# Patient Record
Sex: MALE | Race: OTHER | HISPANIC OR LATINO
[De-identification: names, ages, dates, MRNs, and addresses within clinical notes are randomized per-mention and may not be internally consistent; named-entity substitution may affect disease eponyms.]

---

## 2023-02-27 ENCOUNTER — NON-APPOINTMENT (OUTPATIENT)
Age: 23
End: 2023-02-27

## 2023-02-28 ENCOUNTER — NON-APPOINTMENT (OUTPATIENT)
Age: 23
End: 2023-02-28

## 2023-02-28 ENCOUNTER — TRANSCRIPTION ENCOUNTER (OUTPATIENT)
Age: 23
End: 2023-02-28

## 2023-02-28 PROBLEM — Z00.00 ENCOUNTER FOR PREVENTIVE HEALTH EXAMINATION: Status: ACTIVE | Noted: 2023-02-28

## 2023-03-01 ENCOUNTER — NON-APPOINTMENT (OUTPATIENT)
Age: 23
End: 2023-03-01

## 2023-03-03 ENCOUNTER — NON-APPOINTMENT (OUTPATIENT)
Age: 23
End: 2023-03-03

## 2023-03-03 ENCOUNTER — APPOINTMENT (OUTPATIENT)
Dept: OTOLARYNGOLOGY | Facility: CLINIC | Age: 23
End: 2023-03-03
Payer: MEDICAID

## 2023-03-03 VITALS
DIASTOLIC BLOOD PRESSURE: 78 MMHG | SYSTOLIC BLOOD PRESSURE: 112 MMHG | HEIGHT: 67 IN | BODY MASS INDEX: 26.68 KG/M2 | HEART RATE: 78 BPM | WEIGHT: 170 LBS

## 2023-03-03 DIAGNOSIS — K12.30 ORAL MUCOSITIS (ULCERATIVE), UNSPECIFIED: ICD-10-CM

## 2023-03-03 DIAGNOSIS — K12.2 CELLULITIS AND ABSCESS OF MOUTH: ICD-10-CM

## 2023-03-03 DIAGNOSIS — K12.1 OTHER FORMS OF STOMATITIS: ICD-10-CM

## 2023-03-03 PROCEDURE — 99204 OFFICE O/P NEW MOD 45 MIN: CPT

## 2023-03-03 NOTE — ASSESSMENT
[FreeTextEntry1] : IMMUNE DEFICIENCY AGGRAVATING FACTOR\par DOXYCYCLIN\par MAGIC MOUTHWASH\par CONTACT PERCAUTION\par ANALGESIC PRN\par F/U ID\par F/U PMD

## 2023-03-03 NOTE — HISTORY OF PRESENT ILLNESS
[de-identified] : PAINFUL PALATE ULCE FOR THE PAST FEW DAYS\par UNDER CARE OF ID FOR IMMUNE DEFICIENCY\par MEDICAL HX REVIEWED

## 2023-03-03 NOTE — PHYSICAL EXAM
[Normal] : lingual tonsils are normal [Midline] : trachea located in midline position [de-identified] : SOFT PALATE AND UPPER UVULA ULCER ABOUT 8 MM WITH SURROUNDING REDNESS

## 2023-03-03 NOTE — REVIEW OF SYSTEMS
[Throat Pain] : throat pain [Throat Dryness] : throat dryness [Throat Itching] : throat itching [Negative] : Heme/Lymph [Patient Intake Form Reviewed] : Patient intake form was reviewed [FreeTextEntry7] : diffuclty swlalowujnf [de-identified] : headache

## 2023-03-16 ENCOUNTER — APPOINTMENT (OUTPATIENT)
Dept: INFECTIOUS DISEASE | Facility: CLINIC | Age: 23
End: 2023-03-16
Payer: MEDICAID

## 2023-03-16 ENCOUNTER — LABORATORY RESULT (OUTPATIENT)
Age: 23
End: 2023-03-16

## 2023-03-16 VITALS
SYSTOLIC BLOOD PRESSURE: 114 MMHG | HEIGHT: 67 IN | WEIGHT: 170 LBS | DIASTOLIC BLOOD PRESSURE: 75 MMHG | HEART RATE: 80 BPM | TEMPERATURE: 97.6 F | BODY MASS INDEX: 26.68 KG/M2 | OXYGEN SATURATION: 98 %

## 2023-03-16 DIAGNOSIS — Z23 ENCOUNTER FOR IMMUNIZATION: ICD-10-CM

## 2023-03-16 LAB
APPEARANCE: CLEAR
BASOPHILS # BLD AUTO: 0.05 K/UL
BASOPHILS NFR BLD AUTO: 0.9 %
BILIRUBIN URINE: NEGATIVE
BLOOD URINE: ABNORMAL
COLOR: YELLOW
EOSINOPHIL # BLD AUTO: 0.06 K/UL
EOSINOPHIL NFR BLD AUTO: 1.1 %
ESTIMATED AVERAGE GLUCOSE: 108 MG/DL
GLUCOSE QUALITATIVE U: NEGATIVE
HBA1C MFR BLD HPLC: 5.4 %
HCT VFR BLD CALC: 45.6 %
HGB BLD-MCNC: 14.5 G/DL
IMM GRANULOCYTES NFR BLD AUTO: 0.2 %
KETONES URINE: NEGATIVE
LEUKOCYTE ESTERASE URINE: NEGATIVE
LYMPHOCYTES # BLD AUTO: 1.25 K/UL
LYMPHOCYTES NFR BLD AUTO: 22.7 %
MAN DIFF?: NORMAL
MCHC RBC-ENTMCNC: 28.9 PG
MCHC RBC-ENTMCNC: 31.8 GM/DL
MCV RBC AUTO: 90.8 FL
MONOCYTES # BLD AUTO: 0.38 K/UL
MONOCYTES NFR BLD AUTO: 6.9 %
NEUTROPHILS # BLD AUTO: 3.76 K/UL
NEUTROPHILS NFR BLD AUTO: 68.2 %
NITRITE URINE: NEGATIVE
PH URINE: 6.5
PLATELET # BLD AUTO: 298 K/UL
PROTEIN URINE: NORMAL
RBC # BLD: 5.02 M/UL
RBC # FLD: 13.1 %
SPECIFIC GRAVITY URINE: 1.03
UROBILINOGEN URINE: NORMAL
WBC # FLD AUTO: 5.51 K/UL

## 2023-03-16 PROCEDURE — 99204 OFFICE O/P NEW MOD 45 MIN: CPT | Mod: 25

## 2023-03-16 PROCEDURE — 90686 IIV4 VACC NO PRSV 0.5 ML IM: CPT

## 2023-03-16 PROCEDURE — G0008: CPT

## 2023-03-16 RX ORDER — DOXYCYCLINE 100 MG/1
100 CAPSULE ORAL
Qty: 28 | Refills: 0 | Status: COMPLETED | COMMUNITY
Start: 2023-03-03 | End: 2023-03-16

## 2023-03-16 RX ORDER — DIPHENHYDRAMINE HYDROCHLORIDE AND LIDOCAINE HYDROCHLORIDE AND ALUMINUM HYDROXIDE AND MAGNESIUM HYDRO
KIT
Qty: 1 | Refills: 1 | Status: COMPLETED | COMMUNITY
Start: 2023-03-03 | End: 2023-03-16

## 2023-03-16 NOTE — REASON FOR VISIT
[Initial Evaluation] : an initial evaluation [FreeTextEntry1] : HIV initial consult/ transfer of care

## 2023-03-16 NOTE — REVIEW OF SYSTEMS
[Diarrhea] : diarrhea [Anxiety] : anxiety [FreeTextEntry7] : chronic intermittent  [de-identified] : occasional

## 2023-03-16 NOTE — ASSESSMENT
[FreeTextEntry1] : HIV initial consult/ transfer of care  [Treatment Education] : treatment education [Treatment Adherence] : treatment adherence [Rx Dose / Side Effects] : Rx dose/side effects [Drug Interactions / Side Effects] : drug interactions/side effects [HIV Education] : HIV Education [Sexuality / Safer Sex] : sexuality/safer sex

## 2023-03-16 NOTE — HISTORY OF PRESENT ILLNESS
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 \par denies any c/o at this time \par Immigrated from  6/2022\par \par \par PMH : Gastritis.  denies any recent travel, hospitalizations, blood transfusions. \par PCP : none \par Vaccines : COVID Astra Zenica x2, Pfizer x2, Moderna x1.  needs influenza vaccine.  Monkeypox vaccine x2. \par Screenings : none \par PSH : denies\par PFH : Father- DM, pancreatic CA.  MGM - HTN, osteoporosis.  Mat Aunt - HTN.  \par Social hx : denies any IVDU, confirms vaping.  Occasional ETOH,  denies any tattoos\par Sexual hx : MSM.  Last sexual encounter 2 weeks ago - usual partner.  confirms condom use.  denies any hx of STI. \par Partner : MSM.  Negative HIV- on PrEP\par Occupation : MA\par Lives with : aunt\par Who aware of HIV : friends, partner\par \par NKDA\par \par \par 3/16/2023 Plan\par HIV\par Counselled ARV medication currently taking does not exist in [place] in one pill form.  Counselled regarding Biktarvy - dosing, risks, benefits, SE, long term effects and monitoring.  \par 1. start Biktarvy one tab daily \par 2. do blood work \par 3. f/u one month\par 4. Influenza vaccine given today

## 2023-03-17 DIAGNOSIS — E55.9 VITAMIN D DEFICIENCY, UNSPECIFIED: ICD-10-CM

## 2023-03-17 LAB
25(OH)D3 SERPL-MCNC: 12.4 NG/ML
ALBUMIN SERPL ELPH-MCNC: 4.7 G/DL
ALP BLD-CCNC: 105 U/L
ALT SERPL-CCNC: 16 U/L
ANION GAP SERPL CALC-SCNC: 12 MMOL/L
AST SERPL-CCNC: 19 U/L
BILIRUB SERPL-MCNC: 0.3 MG/DL
BUN SERPL-MCNC: 9 MG/DL
C TRACH RRNA SPEC QL NAA+PROBE: NOT DETECTED
C TRACH RRNA SPEC QL NAA+PROBE: NOT DETECTED
CALCIUM SERPL-MCNC: 9.7 MG/DL
CD3 CELLS # BLD: 656 CELLS/UL
CD3 CELLS NFR BLD: 58 %
CD3+CD4+ CELLS # BLD: 321 CELLS/UL
CD3+CD4+ CELLS NFR BLD: 28 %
CD3+CD4+ CELLS/CD3+CD8+ CLL SPEC: 1.07 RATIO
CD3+CD8+ CELLS # SPEC: 301 CELLS/UL
CD3+CD8+ CELLS NFR BLD: 27 %
CHLORIDE SERPL-SCNC: 102 MMOL/L
CHOLEST SERPL-MCNC: 101 MG/DL
CO2 SERPL-SCNC: 26 MMOL/L
CREAT SERPL-MCNC: 1.06 MG/DL
EGFR: 101 ML/MIN/1.73M2
GLUCOSE SERPL-MCNC: 83 MG/DL
HBV CORE IGG+IGM SER QL: NONREACTIVE
HBV SURFACE AB SER QL: REACTIVE
HBV SURFACE AG SER QL: NONREACTIVE
HCV AB SER QL: NONREACTIVE
HCV S/CO RATIO: 0.51 S/CO
HDLC SERPL-MCNC: 36 MG/DL
HEPATITIS A IGG ANTIBODY: NONREACTIVE
HEPB DNA PCR INT: NOT DETECTED
HEPB DNA PCR LOG: NOT DETECTED LOGIU/ML
HIV1 RNA # SERPL NAA+PROBE: NORMAL
HIV1 RNA # SERPL NAA+PROBE: NORMAL COPIES/ML
LDLC SERPL CALC-MCNC: 57 MG/DL
MEV IGG FLD QL IA: 20 AU/ML
MEV IGG+IGM SER-IMP: POSITIVE
MUV AB SER-ACNC: POSITIVE
MUV IGG SER QL IA: >300 AU/ML
N GONORRHOEA RRNA SPEC QL NAA+PROBE: NOT DETECTED
N GONORRHOEA RRNA SPEC QL NAA+PROBE: NOT DETECTED
NONHDLC SERPL-MCNC: 65 MG/DL
POTASSIUM SERPL-SCNC: 4.6 MMOL/L
PROT SERPL-MCNC: 8.3 G/DL
PSA SERPL-MCNC: 0.24 NG/ML
RUBV IGG FLD-ACNC: 13.9 INDEX
RUBV IGG SER-IMP: POSITIVE
SODIUM SERPL-SCNC: 140 MMOL/L
SOURCE AMPLIFICATION: NORMAL
SOURCE ANAL: NORMAL
T PALLIDUM AB SER QL IA: NEGATIVE
TRIGL SERPL-MCNC: 39 MG/DL
TSH SERPL-ACNC: 1.03 UIU/ML
VIRAL LOAD INTERP: NORMAL
VIRAL LOAD LOG: NORMAL LG COP/ML
VZV AB TITR SER: POSITIVE
VZV IGG SER IF-ACNC: 3021 INDEX

## 2023-03-17 RX ORDER — ERGOCALCIFEROL 1.25 MG/1
1.25 MG CAPSULE, LIQUID FILLED ORAL
Qty: 24 | Refills: 1 | Status: ACTIVE | COMMUNITY
Start: 2023-03-17 | End: 1900-01-01

## 2023-03-20 LAB
C TRACH RRNA SPEC QL NAA+PROBE: NOT DETECTED
M TB IFN-G BLD-IMP: NEGATIVE
N GONORRHOEA RRNA SPEC QL NAA+PROBE: NOT DETECTED
QUANTIFERON TB PLUS MITOGEN MINUS NIL: >10 IU/ML
QUANTIFERON TB PLUS NIL: 0.38 IU/ML
QUANTIFERON TB PLUS TB1 MINUS NIL: -0.05 IU/ML
QUANTIFERON TB PLUS TB2 MINUS NIL: 0.13 IU/ML
SOURCE AMPLIFICATION: NORMAL
SOURCE ORAL: NORMAL
T VAGINALIS RRNA SPEC QL NAA+PROBE: NOT DETECTED

## 2023-03-22 LAB — HLX HLA B57-01 ANTIGEN FINAL REPORT: NORMAL

## 2023-04-05 LAB
HIV GENOSURE ARCHIVE 1: NORMAL
HIV1 PROVIR DNA RT + PR + IN MUT DET SEQ: NORMAL
HIV1 PROVIRAL DNA GENTYP BLD MC NAR: NORMAL

## 2023-04-11 ENCOUNTER — APPOINTMENT (OUTPATIENT)
Dept: INFECTIOUS DISEASE | Facility: CLINIC | Age: 23
End: 2023-04-11
Payer: MEDICAID

## 2023-04-11 VITALS
HEART RATE: 73 BPM | DIASTOLIC BLOOD PRESSURE: 84 MMHG | SYSTOLIC BLOOD PRESSURE: 125 MMHG | OXYGEN SATURATION: 98 % | BODY MASS INDEX: 25.11 KG/M2 | TEMPERATURE: 98.5 F | HEIGHT: 67 IN | WEIGHT: 160 LBS

## 2023-04-11 DIAGNOSIS — B20 HUMAN IMMUNODEFICIENCY VIRUS [HIV] DISEASE: ICD-10-CM

## 2023-04-11 DIAGNOSIS — R68.89 OTHER GENERAL SYMPTOMS AND SIGNS: ICD-10-CM

## 2023-04-11 DIAGNOSIS — F12.99: ICD-10-CM

## 2023-04-11 PROCEDURE — 99214 OFFICE O/P EST MOD 30 MIN: CPT

## 2023-04-11 PROCEDURE — 90791 PSYCH DIAGNOSTIC EVALUATION: CPT

## 2023-04-11 RX ORDER — DOXYCYCLINE HYCLATE 100 MG/1
100 CAPSULE ORAL
Qty: 60 | Refills: 1 | Status: COMPLETED | COMMUNITY
Start: 2023-03-03 | End: 2023-04-11

## 2023-04-11 RX ORDER — BICTEGRAVIR SODIUM, EMTRICITABINE, AND TENOFOVIR ALAFENAMIDE FUMARATE 50; 200; 25 MG/1; MG/1; MG/1
50-200-25 TABLET ORAL
Qty: 90 | Refills: 2 | Status: ACTIVE | COMMUNITY
Start: 2023-03-16 | End: 1900-01-01

## 2023-04-11 RX ORDER — TENOFOVIR DISOPROXIL FUMARATE 300 MG/1
300 TABLET ORAL DAILY
Refills: 0 | Status: COMPLETED | COMMUNITY
Start: 2023-03-16 | End: 2023-04-11

## 2023-04-11 RX ORDER — DOLUTEGRAVIR SODIUM AND LAMIVUDINE 50; 300 MG/1; MG/1
50-300 TABLET, FILM COATED ORAL
Refills: 0 | Status: COMPLETED | COMMUNITY
Start: 2023-03-16 | End: 2023-04-11

## 2023-04-11 RX ORDER — CALCIUM CITRATE/VITAMIN D3 200MG-6.25
500-10 TABLET ORAL
Qty: 90 | Refills: 2 | Status: COMPLETED | COMMUNITY
Start: 2023-03-16 | End: 2023-04-11

## 2023-04-11 NOTE — ASSESSMENT
[Treatment Education] : treatment education [Treatment Adherence] : treatment adherence [Rx Dose / Side Effects] : Rx dose/side effects [HIV Education] : HIV Education [Sexuality / Safer Sex] : sexuality/safer sex [FreeTextEntry1] : HIV f/u consult and results discussion.  HIV stable

## 2023-04-11 NOTE — HISTORY OF PRESENT ILLNESS
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 \par denies any c/o at this time \par Immigrated from [place] 6/2022\par \par \par PMH : Gastritis. denies any recent travel, hospitalizations, blood transfusions. \par PCP : none \par Vaccines : COVID Astra Zenica x2, Pfizer x2, Moderna x1. needs influenza vaccine. Monkeypox vaccine x2. \par Screenings : none \par PSH : denies\par PFH : Father- DM, pancreatic CA. MGM - HTN, osteoporosis. Mat Aunt - HTN. \par Social hx : denies any IVDU, confirms vaping. Occasional ETOH, denies any tattoos\par Sexual hx : MSM. Last sexual encounter 2 weeks ago - usual partner. confirms condom use. denies any hx of STI. \par Partner : MSM. Negative HIV- on PrEP\par Occupation : MA\par Lives with : aunt\par Who aware of HIV : friends, partner\par \par NKDA\par \par \par \par \par 4/11/2023 Plan \par 1. continue current treatment - refills given \par 2. f/u 6 months \par 3. pt to bring vaccine record to next consult.

## 2023-08-16 ENCOUNTER — NON-APPOINTMENT (OUTPATIENT)
Age: 23
End: 2023-08-16

## 2023-08-20 ENCOUNTER — NON-APPOINTMENT (OUTPATIENT)
Age: 23
End: 2023-08-20

## 2023-08-21 ENCOUNTER — NON-APPOINTMENT (OUTPATIENT)
Age: 23
End: 2023-08-21

## 2023-08-31 ENCOUNTER — NON-APPOINTMENT (OUTPATIENT)
Age: 23
End: 2023-08-31

## 2023-09-22 ENCOUNTER — NON-APPOINTMENT (OUTPATIENT)
Age: 23
End: 2023-09-22

## 2023-09-25 ENCOUNTER — NON-APPOINTMENT (OUTPATIENT)
Age: 23
End: 2023-09-25

## 2023-10-10 ENCOUNTER — APPOINTMENT (OUTPATIENT)
Dept: INFECTIOUS DISEASE | Facility: CLINIC | Age: 23
End: 2023-10-10

## 2023-11-17 ENCOUNTER — NON-APPOINTMENT (OUTPATIENT)
Age: 23
End: 2023-11-17

## 2023-11-24 ENCOUNTER — NON-APPOINTMENT (OUTPATIENT)
Age: 23
End: 2023-11-24

## 2023-12-27 ENCOUNTER — NON-APPOINTMENT (OUTPATIENT)
Age: 23
End: 2023-12-27

## 2024-01-17 ENCOUNTER — NON-APPOINTMENT (OUTPATIENT)
Age: 24
End: 2024-01-17

## 2024-01-26 ENCOUNTER — NON-APPOINTMENT (OUTPATIENT)
Age: 24
End: 2024-01-26

## 2024-01-30 ENCOUNTER — NON-APPOINTMENT (OUTPATIENT)
Age: 24
End: 2024-01-30

## 2024-04-11 ENCOUNTER — NON-APPOINTMENT (OUTPATIENT)
Age: 24
End: 2024-04-11

## 2024-04-18 ENCOUNTER — NON-APPOINTMENT (OUTPATIENT)
Age: 24
End: 2024-04-18